# Patient Record
Sex: FEMALE | Race: OTHER | Employment: OTHER | URBAN - METROPOLITAN AREA
[De-identification: names, ages, dates, MRNs, and addresses within clinical notes are randomized per-mention and may not be internally consistent; named-entity substitution may affect disease eponyms.]

---

## 2018-03-30 ENCOUNTER — HOSPITAL ENCOUNTER (OUTPATIENT)
Dept: LAB | Age: 78
Discharge: HOME | End: 2018-03-30
Payer: MEDICARE

## 2018-03-30 PROCEDURE — 88305 TISSUE EXAM BY PATHOLOGIST: CPT | Performed by: PLASTIC SURGERY

## 2018-04-24 RX ORDER — ROSUVASTATIN CALCIUM 20 MG/1
20 TABLET, COATED ORAL
COMMUNITY
Start: 2018-02-21

## 2018-04-26 ENCOUNTER — ANESTHESIA EVENT (OUTPATIENT)
Dept: ENDOSCOPY | Age: 78
End: 2018-04-26
Payer: MEDICARE

## 2018-04-27 ENCOUNTER — HOSPITAL ENCOUNTER (OUTPATIENT)
Age: 78
Setting detail: OUTPATIENT SURGERY
Discharge: HOME | End: 2018-04-27
Attending: INTERNAL MEDICINE | Admitting: INTERNAL MEDICINE
Payer: MEDICARE

## 2018-04-27 ENCOUNTER — ANESTHESIA (OUTPATIENT)
Dept: ENDOSCOPY | Age: 78
End: 2018-04-27
Payer: MEDICARE

## 2018-04-27 VITALS
TEMPERATURE: 97 F | HEIGHT: 65 IN | RESPIRATION RATE: 12 BRPM | BODY MASS INDEX: 27.99 KG/M2 | OXYGEN SATURATION: 98 % | DIASTOLIC BLOOD PRESSURE: 78 MMHG | WEIGHT: 168 LBS | SYSTOLIC BLOOD PRESSURE: 125 MMHG | HEART RATE: 78 BPM

## 2018-04-27 PROBLEM — Z86.010 HISTORY OF COLON POLYPS: Status: ACTIVE | Noted: 2018-04-27

## 2018-04-27 PROCEDURE — 74011250636 HC RX REV CODE- 250/636

## 2018-04-27 PROCEDURE — 76060000031 HC ANESTHESIA FIRST 0.5 HR: Performed by: INTERNAL MEDICINE

## 2018-04-27 PROCEDURE — 74011250636 HC RX REV CODE- 250/636: Performed by: NURSE ANESTHETIST, CERTIFIED REGISTERED

## 2018-04-27 PROCEDURE — 93005 ELECTROCARDIOGRAM TRACING: CPT

## 2018-04-27 PROCEDURE — 76040000019: Performed by: INTERNAL MEDICINE

## 2018-04-27 PROCEDURE — 77030031670 HC DEV INFL ENDOTEK BIG60 MRTM -B: Performed by: INTERNAL MEDICINE

## 2018-04-27 RX ORDER — LIDOCAINE HYDROCHLORIDE 10 MG/ML
0.1 INJECTION, SOLUTION EPIDURAL; INFILTRATION; INTRACAUDAL; PERINEURAL AS NEEDED
Status: DISCONTINUED | OUTPATIENT
Start: 2018-04-27 | End: 2018-04-27 | Stop reason: HOSPADM

## 2018-04-27 RX ORDER — PROPOFOL 10 MG/ML
INJECTION, EMULSION INTRAVENOUS AS NEEDED
Status: DISCONTINUED | OUTPATIENT
Start: 2018-04-27 | End: 2018-04-27 | Stop reason: HOSPADM

## 2018-04-27 RX ORDER — SODIUM CHLORIDE, SODIUM LACTATE, POTASSIUM CHLORIDE, CALCIUM CHLORIDE 600; 310; 30; 20 MG/100ML; MG/100ML; MG/100ML; MG/100ML
25 INJECTION, SOLUTION INTRAVENOUS CONTINUOUS
Status: DISCONTINUED | OUTPATIENT
Start: 2018-04-27 | End: 2018-04-27 | Stop reason: HOSPADM

## 2018-04-27 RX ORDER — FAMOTIDINE 20 MG/1
20 TABLET, FILM COATED ORAL ONCE
Status: DISCONTINUED | OUTPATIENT
Start: 2018-04-27 | End: 2018-04-27 | Stop reason: HOSPADM

## 2018-04-27 RX ADMIN — SODIUM CHLORIDE, SODIUM LACTATE, POTASSIUM CHLORIDE, AND CALCIUM CHLORIDE: 600; 310; 30; 20 INJECTION, SOLUTION INTRAVENOUS at 08:23

## 2018-04-27 RX ADMIN — PROPOFOL 80 MG: 10 INJECTION, EMULSION INTRAVENOUS at 09:18

## 2018-04-27 RX ADMIN — PROPOFOL 60 MG: 10 INJECTION, EMULSION INTRAVENOUS at 09:30

## 2018-04-27 RX ADMIN — PROPOFOL 30 MG: 10 INJECTION, EMULSION INTRAVENOUS at 09:22

## 2018-04-27 RX ADMIN — PROPOFOL 30 MG: 10 INJECTION, EMULSION INTRAVENOUS at 09:26

## 2018-04-27 NOTE — ANESTHESIA POSTPROCEDURE EVALUATION
Post-Anesthesia Evaluation & Assessment    Visit Vitals    /78    Pulse 78    Temp 36.1 °C (97 °F)    Resp 12    Ht 5' 5\" (1.651 m)    Wt 76.2 kg (168 lb)    SpO2 98%    BMI 27.96 kg/m2       Nausea/Vomiting: no nausea and no vomiting    Pain score (VAS): 0    Post-operative hydration adequate.     Mental status & Level of consciousness: orientation per pre-anesthetic level    Neurological status: moves all extremities, sensation grossly intact    Pulmonary status: airway patent, no supplemental oxygen required    Complications related to anesthesia: none    Additional comments:        Patrice Schaefer CRNA  April 27, 2018

## 2018-04-27 NOTE — IP AVS SNAPSHOT
[address] [address] Shahrzad Raineydonnell Bullock 
[phone] Patient: Mirtha Barnhart MRN: [id number] THM:3[birth year]13 About your hospitalization You were admitted on:  April 27, 2018 You last received care in the:  [facility] PHASE 2 RECOVERY You were discharged on:  April 27, 2018 Why you were hospitalized Your primary diagnosis was:  History Of Colon Polyps Follow-up Information Follow up With Details Comments Contact Info Nicolas Anthony MD Schedule an appointment as soon as possible for a visit  [address] [facility] [address] [facility] [address] [phone] Juan C Kent MD  As needed [address] [facility] [address] [facility] [address] [phone] Discharge Orders None A check lloyd indicates which time of day the medication should be taken. My Medications CONTINUE taking these medications Instructions Each Dose to Equal  
 Morning Noon Evening Bedtime  
 cholecalciferol (vitamin D3) 2,000 unit Tab Your last dose was: Your next dose is: Take 1 Tab by mouth. 1 Tab  
    
   
   
   
  
 omeprazole 20 mg capsule Commonly known as:  PRILOSEC Your last dose was: Your next dose is: Take 20 mg by mouth daily. 20 mg  
    
   
   
   
  
 oxyCODONE-acetaminophen 5-325 mg per tablet Commonly known as:  PERCOCET Your last dose was: Your next dose is: Take 1-2 Tabs by mouth every four (4) hours as needed for Pain. Max Daily Amount: 12 Tabs. 1-2 Tab  
    
   
   
   
  
 rosuvastatin 20 mg tablet Commonly known as:  CRESTOR Your last dose was: Your next dose is:    
   
   
 20 mg.  
 20 mg  
    
   
   
   
  
 telmisartan-hydroCHLOROthiazide 80-12.5 mg per tablet Commonly known as:  MICARDIS HCT Your last dose was: Your next dose is: Take 1 Tab by mouth daily. 1 Tab Opioid Education Prescription Opioids: What You Need to Know: 
 
 
After general anesthesia or intravenous sedation, for 24 hours or while taking prescription Narcotics: · Limit your activities · Do not drive and operate hazardous machinery · Do not make important personal or business decisions · Do  not drink alcoholic beverages · If you have not urinated within 8 hours after discharge, please contact your surgeon on call. Report the following to your surgeon: 
· Excessive pain, swelling, redness or odor of or around the surgical area · Temperature over 100.5 · Nausea and vomiting lasting longer than 4 hours or if unable to take medications · Any signs of decreased circulation or nerve impairment to extremity: change in color, persistent  numbness, tingling, coldness or increase pain · Any questions What to do at Home: These are general instructions for a healthy lifestyle: No smoking/ No tobacco products/ Avoid exposure to second hand smoke Surgeon General's Warning:  Quitting smoking now greatly reduces serious risk to your health. Obesity, smoking, and sedentary lifestyle greatly increases your risk for illness A healthy diet, regular physical exercise & weight monitoring are important for maintaining a healthy lifestyle You may be retaining fluid if you have a history of heart failure or if you experience any of the following symptoms:  Weight gain of 3 pounds or more overnight or 5 pounds in a week, increased swelling in our hands or feet or shortness of breath while lying flat in bed. Please call your doctor as soon as you notice any of these symptoms; do not wait until your next office visit. Recognize signs and symptoms of STROKE: 
 
F-face looks uneven A-arms unable to move or move unevenly S-speech slurred or non-existent T-time-call 911 as soon as signs and symptoms begin-DO NOT go Back to bed or wait to see if you get better-TIME IS BRAIN. Warning Signs of HEART ATTACK Call 911 if you have these symptoms: 
? Chest discomfort. Most heart attacks involve discomfort in the center of the chest that lasts more than a few minutes, or that goes away and comes back. It can feel like uncomfortable pressure, squeezing, fullness, or pain. ? Discomfort in other areas of the upper body. Symptoms can include pain or discomfort in one or both arms, the back, neck, jaw, or stomach. ? Shortness of breath with or without chest discomfort. ? Other signs may include breaking out in a cold sweat, nausea, or lightheadedness. Don't wait more than five minutes to call [address]! Fast action can save your life. Calling 911 is almost always the fastest way to get lifesaving treatment. Emergency Medical Services staff can begin treatment when they arrive  up to an hour sooner than if someone gets to the hospital by car. The discharge information has been reviewed with the patient. The patient verbalized understanding.  
Discharge medications reviewed with the patient and appropriate educational materials and side effects teaching were provided. ___________________________________________________________________________________________________________________________________ Patient armband removed and given to patient to take home. Patient was informed of the privacy risks if armband lost or stolen Introducing [facility]! [facility] introduce portal paciente MyChart . Ahora se puede acceder a partes de sol expediente médico, enviar por correo electrónico la oficina de sol médico y solicitar renovaciones de medicamentos en línea. En sol navegador de Internet , Dee chow [email or web address] Tristen clic en el usuario por Adria Stearns? Chu Naty clic aquí en la sesión [facility]. Verá la página de registro [place]. Ingrese sol código de Bank of July myriam y apolinar aparece a continuación. Usted no tendrá que [employer] código después de arabella completado el proceso de registro . Si usted no se inscribe antes de la fecha de caducidad , debe solicitar un nuevo código. · MyChart Código de acceso : [id number] Expires: 7/26/2018  9:56 AM 
 
Ingresa los últimos cuatro dígitos de sol Número de Seguro Social ( xxxx ) y fecha de nacimiento ( dd / mm / aaaa ) apolinar se indica y tristen clic en Enviar. Usted será llevado a la siguiente página de registro . Crear un ID MyChart . Esta será sol ID de inicio de sesión de MyChart y no puede ser [place] , por lo que pensar en ritesh que es Saloni Hamming y fácil de recordar . Crear ritesh contraseña MyChart . Usted puede cambiar sol contraseña en cualquier momento . Ingrese sol Password Reset de preguntas y [place] . [place] se puede utilizar en un momento posterior si usted olvida sol contraseña. Introduzca sol dirección de correo electrónico . Nino Phan recibirá ritesh notificación por correo electrónico cuando la nueva información está disponible en MyChart . Jacki orellana en Registrarse.  Corin Pu maru y descargar porciones de sol expediente Katia orellana en el enlace de descarga del menú Resumen para descargar ritesh copia portátil de sol información médica . Si tiene [employer] , por favor visite la sección de preguntas frecuentes del sitio web MyChart . Recuerde, MyChart NO es que se utilizará para las necesidades urgentes. Para emergencias médicas , llame al 911 . Ahora disponible en sol iPhone y Android ! Introducing Sedrick Mendoza As a [facility] patient, I wanted to make you aware of our electronic visit tool called Sedrick Mendoza. [facility] 24/7 allows you to connect within minutes with a medical provider 24 hours a day, seven days a week via a mobile device or tablet or logging into a secure website from your computer. You can access Sedrick KnotProfit from anywhere in the [place]. A virtual visit might be right for you when you have a simple condition and feel like you just dont want to get out of bed, or cant get away from work for an appointment, when your regular Hussein Todacell provider is not available (evenings, weekends or holidays), or when youre out of town and need minor care. Electronic visits cost only $49 and if the [facility] 24/7 provider determines a prescription is needed to treat your condition, one can be electronically transmitted to a nearby pharmacy*. Please take a moment to enroll today if you have not already done so. The enrollment process is free and takes just a few minutes. To enroll, please download the Hussein Cho 24/7 richa to your tablet or phone, or visit [email or web address] to enroll on your computer. And, as an [address] [place] [address] patient with a [employer] account, the results of your visits will be scanned into your electronic medical record and your primary care provider will be able to view the scanned results.    
We urge you to continue to see your regular Hussein Cho provider for your ongoing medical care. And while your primary care provider may not be the one available when you seek a Sedrick Zamoranofin virtual visit, the peace of mind you get from getting a real diagnosis real time can be priceless. For more information on Sedrick Zamoranofin, view our Frequently Asked Questions (FAQs) at [email or web address]. Sincerely, 
 
Halina Berkowitz MD 
Chief Medical Officer Asael Garcia *:  certain medications cannot be prescribed via [employer] Unresulted Labs-Please follow up with your PCP about these lab tests Order Current Status EKG, 12 LEAD, INITIAL Preliminary result Providers Seen During Your Hospitalization Provider Specialty Primary office phone Alex Robles MD Gastroenterology [phone] Your Primary Care Physician (PCP) Primary Care Physician Office Phone Office Fax All Julio [phone] You are allergic to the following Allergen Reactions Penicillins Anaphylaxis Sulfa (Sulfonamide Antibiotics) Hives Recent Documentation Height Weight BMI OB Status Smoking Status 1.651 m 76.2 kg 27.96 kg/m2 Postmenopausal Never Smoker Emergency Contacts Name Discharge Info Relation Home Work Mobile Adolfo Retana DISCHARGE CAREGIVER [3] Spouse [3] [phone] Patient Belongings The following personal items are in your possession at time of discharge: 
     Visual Aid: None Discharge Instructions Attachments/References DIVERTICULOSIS (Greenlandic) Patient Handouts Diverticulosis: Care Instructions Your Care Instructions In diverticulosis, pouches called diverticula form in the wall of the large intestine (colon). The pouches do not cause any pain or other symptoms. Most people who have diverticulosis do not know they have it.  But the pouches sometimes bleed, and if they become infected, they can cause pain and other symptoms. When this happens, it is called diverticulitis. Diverticula form when pressure pushes the wall of the colon outward at certain weak points. A diet that is too low in fiber can cause diverticula. Follow-up care is a key part of your treatment and safety. Be sure to make and go to all appointments, and call your doctor if you are having problems. It's also a good idea to know your test results and keep a list of the medicines you take. How can you care for yourself at home? · Include fruits, leafy green vegetables, beans, and whole grains in your diet each day. These foods are high in fiber. · Take a fiber supplement, such as Citrucel or Metamucil, every day if needed. Read and follow all instructions on the label. · Drink plenty of fluids, enough so that your urine is light yellow or clear like water. If you have kidney, heart, or liver disease and have to limit fluids, talk with your doctor before you increase the amount of fluids you drink. · Get at least 30 minutes of exercise on most days of the week. Walking is a good choice. You also may want to do other activities, such as running, swimming, cycling, or playing tennis or team sports. · Cut out foods that cause gas, pain, or other symptoms. When should you call for help? Call your doctor now or seek immediate medical care if: 
? · You have belly pain. ? · You pass maroon or very bloody stools. ? · You have a fever. ? · You have nausea and vomiting. ? · You have unusual changes in your bowel movements or abdominal swelling. ? · You have burning pain when you urinate. ? · You have abnormal vaginal discharge. ? · You have shoulder pain. ? · You have cramping pain that does not get better when you have a bowel movement or pass gas. ? · You pass gas or stool from your urethra while urinating. ? Watch closely for changes in your health, and be sure to contact your doctor if you have any problems. Where can you learn more? Go to http://katharine-patricia.info/. Enter I744 in the search box to learn more about \"Diverticulosis: Care Instructions. \" Current as of: May 12, 2017 Content Version: 11.4 © 2006-2017 [employer]. Care instructions adapted under license by [employer]. [employer] (which disclaims liability or warranty for this information). If you have questions about a medical condition or this instruction, always ask your healthcare professional. [address] any warranty or liability for your use of this information. Please provide this summary of care documentation to your next provider. Signatures-by signing, you are acknowledging that this After Visit Summary has been reviewed with you and you have received a copy. Patient Signature:  ____________________________________________________________ Date:  ____________________________________________________________  
  
Nel Causeyamento Provider Signature:  ____________________________________________________________ Date:  ____________________________________________________________  
  
  
   
  
[address] [facility] 
 
 
 [address] 
[phone] Patient: Jose Cunha MRN: [id number] FJZ:7/0/3316 Sobre frank hospitalización Le admitieron el:  April 27, 2018 Frank tratamiento más reciente fue el:  [facility] PHASE 2 RECOVERY Le dieron de yesika el:  April 27, 2018 Por qué le ingresaron Frank diagnosis primaria es:  History Of Colon Polyps Follow-up Information Follow up With Details Comments Contact Info Michele Jacobo MD Schedule an appointment as soon as possible for a visit  [address] [facility] [address] [facility] 83 [address] 
[phone] Sandy Monroe MD  As needed [address] [facility] [address] [facility] [address] [phone] Discharge Orders [facility] A check lloyd indicates which time of day the medication should be taken. My Medications SIGA tomando estos medicamentos Instructions Each Dose to Equal  
 Morning Noon Evening Bedtime  
 cholecalciferol (vitamin D3) 2,000 unit Tab Your last dose was: Your next dose is: Take 1 Tab by mouth. 1 Tab  
    
   
   
   
  
 omeprazole 20 mg capsule También conocido apolinar:  Arevalo Eliud Your last dose was: Your next dose is: Take 20 mg by mouth daily. 20 mg  
    
   
   
   
  
 oxyCODONE-acetaminophen 5-325 mg per tablet También conocido apolinar:  PERCOCET Your last dose was: Your next dose is: Take 1-2 Tabs by mouth every four (4) hours as needed for Pain. Max Daily Amount: 12 Tabs. 1-2 Tab  
    
   
   
   
  
 rosuvastatin 20 mg tablet También conocido apolinar:  Erik Carey Your last dose was: Your next dose is:    
   
   
 20 mg.  
 20 mg  
    
   
   
   
  
 telmisartan-hydroCHLOROthiazide 80-12.5 mg per tablet También conocido apolinar:  MICARDIS HCT Your last dose was: Your next dose is: Take 1 Tab by mouth daily. 1 Tab Opioid Education Prescription Opioids: What You Need to Know: 
 
 
After general anesthesia or intravenous sedation, for 24 hours or while taking prescription Narcotics: · Limit your activities · Do not drive and operate hazardous machinery · Do not make important personal or business decisions · Do  not drink alcoholic beverages · If you have not urinated within 8 hours after discharge, please contact your surgeon on call. Report the following to your surgeon: 
· Excessive pain, swelling, redness or odor of or around the surgical area · Temperature over 100.5 · Nausea and vomiting lasting longer than 4 hours or if unable to take medications · Any signs of decreased circulation or nerve impairment to extremity: change in color, persistent  numbness, tingling, coldness or increase pain · Any questions What to do at Home: These are general instructions for a healthy lifestyle: No smoking/ No tobacco products/ Avoid exposure to second hand smoke Surgeon General's Warning:  Quitting smoking now greatly reduces serious risk to your health. Obesity, smoking, and sedentary lifestyle greatly increases your risk for illness A healthy diet, regular physical exercise & weight monitoring are important for maintaining a healthy lifestyle You may be retaining fluid if you have a history of heart failure or if you experience any of the following symptoms:  Weight gain of 3 pounds or more overnight or 5 pounds in a week, increased swelling in our hands or feet or shortness of breath while lying flat in bed. Please call your doctor as soon as you notice any of these symptoms; do not wait until your next office visit. Recognize signs and symptoms of STROKE: 
 
F-face looks uneven A-arms unable to move or move unevenly S-speech slurred or non-existent T-time-call 911 as soon as signs and symptoms begin-DO NOT go  
 Back to bed or wait to see if you get better-TIME IS BRAIN. Warning Signs of HEART ATTACK Call 911 if you have these symptoms: 
? Chest discomfort. Most heart attacks involve discomfort in the center of the chest that lasts more than a few minutes, or that goes away and comes back. It can feel like uncomfortable pressure, squeezing, fullness, or pain. ? Discomfort in other areas of the upper body. Symptoms can include pain or discomfort in one or both arms, the back, neck, jaw, or stomach. ? Shortness of breath with or without chest discomfort. ? Other signs may include breaking out in a cold sweat, nausea, or lightheadedness. Don't wait more than five minutes to call [address]! Fast action can save your life. Calling 911 is almost always the fastest way to get lifesaving treatment. Emergency Medical Services staff can begin treatment when they arrive  up to an hour sooner than if someone gets to the hospital by car. The discharge information has been reviewed with the patient. The patient verbalized understanding. Discharge medications reviewed with the patient and appropriate educational materials and side effects teaching were provided. ___________________________________________________________________________________________________________________________________ Patient armband removed and given to patient to take home. Patient was informed of the privacy risks if armband lost or stolen Introducing [facility]! [facility] introduce portal paciente BitLeaphart . Ahora se puede acceder a partes de sol expediente médico, enviar por correo electrónico la oficina de sol médico y solicitar renovaciones de medicamentos en línea. En sol navegador de Internet , Juan F chow [email or web address] Paul esteban en el usuario por Parveen Estrada? Suzi Packwood esteban aquí en la sesión Drenda Dapper. Verá la página de registro [place]. Ingrese osl código de Bank of July myriam y apolinar aparece a continuación. Usted no tendrá que [employer] código después de arabella completado el proceso de registro . Si usted no se inscribe antes de la fecha de caducidad , debe solicitar un nuevo código. · MyChart Código de acceso : [id number] Expires: 7/26/2018  9:56 AM 
 
Ingresa los últimos cuatro dígitos de sol Número de Seguro Social ( xxxx ) y fecha de nacimiento ( dd / mm / aaaa ) apolinar se indica y paul clic en Enviar. Usted será llevado a la siguiente página de registro . Crear un ID MyChart . Esta será sol ID de inicio de sesión de MyChart y no puede ser [place] , por lo que pensar en ritesh que es Terrial Piper y fácil de recordar . Crear ritesh contraseña MyChart . Usted puede cambiar sol contraseña en cualquier momento . Ingrese sol Password Reset de preguntas y [place] . Hoot Owl se puede utilizar en un momento posterior si usted olvida sol contraseña. Introduzca sol dirección de correo electrónico . [facility] recibirá ritesh notificación por correo electrónico cuando la nueva información está disponible en MyChart . Cielo Coupe clic en Registrarse. Dilip Angus maru y descargar porciones de sol expediente médico. 
Paul clic en el enlace de descarga del menú Resumen para descargar ritesh copia portátil de sol información médica . Si tiene [employer] , por favor visite la sección de preguntas frecuentes del sitio web MyChart . Recuerde, MyChart NO es que se utilizará para las necesidades urgentes. Para emergencias médicas , llame al 911 . Ahora disponible en sol iPhone y Android ! Introducing Sedrick Hannifin As a Annika Andrea patient, I wanted to make you aware of our electronic visit tool called Sedrick Mendoza. Annika Andrea 24/7 allows you to connect within minutes with a medical provider 24 hours a day, seven days a week via a mobile device or tablet or logging into a secure website from your computer.   You can access [facility] Elasticsearch 24/7 from anywhere in the [place]. A virtual visit might be right for you when you have a simple condition and feel like you just dont want to get out of bed, or cant get away from work for an appointment, when your regular Ad[facility] [place] [facility] provider is not available (evenings, weekends or holidays), or when youre out of town and need minor care. Electronic visits cost only $49 and if the [facility] provider determines a prescription is needed to treat your condition, one can be electronically transmitted to a nearby pharmacy*. Please take a moment to enroll today if you have not already done so. The enrollment process is free and takes just a few minutes. To enroll, please download the [employer] 24/7 richa to your tablet or phone, or visit [email or web address] to enroll on your computer. And, as an [address] [place] [address] patient with a [employer] account, the results of your visits will be scanned into your electronic medical record and your primary care provider will be able to view the scanned results. We urge you to continue to see your regular Adelaida Slocumb provider for your ongoing medical care. And while your primary care provider may not be the one available when you seek a [facility] virtual visit, the peace of mind you get from getting a real diagnosis real time can be priceless. For more information on [facility], view our Frequently Asked Questions (FAQs) at [email or web address]. Sincerely, 
 
Endy Mcdaniel MD 
Chief Medical Officer [facility] Leona Garcia *:  certain medications cannot be prescribed via [facility] Unresulted Labs-Please follow up with your PCP about these lab tests Order Current Status EKG, 12 LEAD, INITIAL Preliminary result Providers Seen During Your Hospitalization Personal Médico Especialidad Teléfono principal de la ofsolangea Dyan Quintero MD Gastroenterology [phone] Frank médico de atención primaria (PCP ) Primary Care Physician Office Phone Office Fax Perfecto Castillo [phone] Tiene alergias a lo siguiente Particia Betters Penicillins Anaphylaxis Sulfa (Sulfonamide Antibiotics) Hives Documentación recientes Height Weight [place] ([facility] Estado obstétrico Estatus de tabaquísmo 1.651 m 76.2 kg 27.96 kg/m2 Postmenopausal Never Smoker Emergency Contacts Name Discharge Info Relation Home Work Mobile RetanaTrevor taylorshi NORRIS DISCHARGE CAREGIVER [3] Spouse [3] [phone] Patient Belongings The following personal items are in your possession at time of discharge: 
     Visual Aid: None Discharge Instructions Attachments/References DIVERTICULOSIS (Kazakh) Patient Handouts Diverticulosis: Instrucciones de cuidado - [ Diverticulosis: Care Instructions ] Instrucciones de cuidado En la diverticulosis, se juliet bolsas, llamadas divertículos, en las meyer del intestino grueso (colon). Estas bolsas no causan dolor ni otros síntomas. La mayoría de las personas que tienen diverticulosis ignoran que la tienen. Sin embargo, las bolsas a veces sangran y, si se infectan, pueden causar dolor y otros síntomas. Si esto sucede, se habla de diverticulitis. Los divertículos se juliet cuando la presión [employer] meyer del colon hacia afuera en ciertos puntos débiles. Татьяна dieta muy baja en fibra puede causar divertículos. La atención de seguimiento es татьяна parte clave de frank tratamiento y seguridad. Asegúrese de hacer y acudir a todas las citas, y llame a frank médico si está teniendo problemas. También es татьяна buena idea saber los resultados de los exámenes y mantener татьяна lista de los medicamentos que francia. Cómo puede cuidarse en el hogar?  
· Incluya frutas, verduras de hojas verdes, frijoles (habichuelas) y granos integrales en sol Mickeal Aase. Estos alimentos son ricos en fibra. · Si es necesario, tome un suplemento de Gail, apolinar Citrucel o Metamucil, todos los [address] Comience con [place] dosis pequeña y Yohana poco a poco shashank un mes o más. · Reshma abundantes líquidos, suficientes para que sol orina sea de color amarillo luz maria o katina apolinar el agua. Si tiene ritesh enfermedad del riñón, del corazón o del hígado y tiene que Saba's líquidos, hable con sol médico antes de aumentar sol consumo. · Paul, apolinar mínimo, 30 minutos de ejercicio la mayoría de los días de la [place]. Caminar es ritesh buena opción. Sammye Founds desee hacer otras actividades, apolinar correr, nadar, [employer], o jugar al tenis u otros deportes de equipo. · Elimine los alimentos que causen gases, dolor u otros síntomas. Cuándo debe pedir ayuda? Llame a sol médico ahora mismo o busque atención médica inmediata si: 
? · Tiene dolor en el vientre. ? · Las heces son de color rojizo o muy sanguinolentas (con disha). ? · Tiene fiebre. ? · Tiene náuseas y vómito. ? · Tiene cambios inusuales en sol evacuación intestinal o hinchazón en el vientre. ? · Siente dolor y ardor al orinar. ? · Tiene flujo vaginal anormal.  
? · Tiene dolor en el hombro. ? · Tiene dolor por retortijones que no mejora con ritesh evacuación intestinal o al pasar gases. ? · Pasa gases o heces por la uretra al Natalie Bonus. ?Preste especial atención a los cambios en sol clint y asegúrese de comunicarse con sol médico si tiene algún problema. Dónde puede encontrar más información en inglés? Amber Wynn a http://katharine-patricia.info/. Hallie Servant D346 en la búsqueda para aprender Dock Dauer de \"Diverticulosis: Instrucciones de cuidado - [ Diverticulosis: Care Instructions ]. \" 
Revisado: [address], 2017 Versión del contenido: 11.4 © 3737-1666 [employer].  Las instrucciones de cuidado fueron adaptadas bajo licencia por [employer] (which disclaims liability or warranty for this information). Si usted tiene [place] afección médica o sobre estas instrucciones, siempre pregunte a sol profesional de clint. Healthwise, Incorporated niega toda garantía o responsabilidad por sol uso de esta información. Please provide this summary of care documentation to your next provider. Signatures-by signing, you are acknowledging that this After Visit Summary has been reviewed with you and you have received a copy. Patient Signature:  ____________________________________________________________ Date:  ____________________________________________________________  
  
[place] Provider Signature:  ____________________________________________________________ Date:  ____________________________________________________________

## 2018-04-27 NOTE — H&P
History and Physical    Noe Torres  1940  [id number]  [id number]    Assessment:  Hx of colon polyps. Treatment/Plan:  Colonoscopy    History of present illness:  69 y.o. female here for surveillance  Colonoscopy bec of hx of colon polyps. ROS: No chest pain, shortness of breath, headaches, dizziness, lightheadedness, nausea, vomiting or abdominal pain. Evaluation of past illnesses, surgeries, or injuries:  yes    Past Medical History:   Diagnosis Date    GERD (gastroesophageal reflux disease)     Hypercholesteremia     Hypertension     Irritable bowel syndrome        Past Surgical History:   Procedure Laterality Date    HX BREAST BIOPSY      HX CATARACT REMOVAL      HX KNEE ARTHROSCOPY      HX KNEE REPLACEMENT Right        Allergies: Allergies   Allergen Reactions    Penicillins Anaphylaxis    Sulfa (Sulfonamide Antibiotics) Hives       Previous reactions to sedation/analgesia? no     Review of current medications, supplement, herbals and nutraceuticals complete:  yes    Pertinent labs reviewed? yes    History of active substance abuse?  no    History reviewed. No pertinent family history. Social History     Social History    Marital status:      Spouse name: N/A    Number of children: N/A    Years of education: N/A     Occupational History    Not on file. Social History Main Topics    Smoking status: Never Smoker    Smokeless tobacco: Never Used    Alcohol use 8.4 oz/week     14 Glasses of wine per week    Drug use: No    Sexual activity: Not on file     Other Topics Concern    Not on file     Social History Narrative       Examination:  Cardiac Status:  WNL    Mental Status:  WNL     Pulmonary Status:  WNL    NPO:  9-12    Zhou Aparicio MD, [place]  4/27/2018  8:27 AM

## 2018-04-27 NOTE — DISCHARGE INSTRUCTIONS
For the next 12 hours you should not:   1. drive   2. drink alcohol   3. operate any machinery   4. engage in activities that require mental sharpness or manual dexterity    5. take any drugs other than those prescribed by a physician   6. make any legal or financial decisions    Call your doctor's office immediately, if:   1. Severe rectal bleeding   2. High fever   3. Severe abdominal pain    Take it easy today and resume normal activity tomorrow. Resume previous diet. Zhou Bridges MD, FACP         DISCHARGE SUMMARY from Nurse    PATIENT INSTRUCTIONS:    After general anesthesia or intravenous sedation, for 24 hours or while taking prescription Narcotics:  · Limit your activities  · Do not drive and operate hazardous machinery  · Do not make important personal or business decisions  · Do  not drink alcoholic beverages  · If you have not urinated within 8 hours after discharge, please contact your surgeon on call. Report the following to your surgeon:  · Excessive pain, swelling, redness or odor of or around the surgical area  · Temperature over 100.5  · Nausea and vomiting lasting longer than 4 hours or if unable to take medications  · Any signs of decreased circulation or nerve impairment to extremity: change in color, persistent  numbness, tingling, coldness or increase pain  · Any questions    What to do at Home:    These are general instructions for a healthy lifestyle:    No smoking/ No tobacco products/ Avoid exposure to second hand smoke  Surgeon General's Warning:  Quitting smoking now greatly reduces serious risk to your health.     Obesity, smoking, and sedentary lifestyle greatly increases your risk for illness    A healthy diet, regular physical exercise & weight monitoring are important for maintaining a healthy lifestyle    You may be retaining fluid if you have a history of heart failure or if you experience any of the following symptoms:  Weight gain of 3 pounds or more overnight or 5 pounds in a week, increased swelling in our hands or feet or shortness of breath while lying flat in bed. Please call your doctor as soon as you notice any of these symptoms; do not wait until your next office visit. Recognize signs and symptoms of STROKE:    F-face looks uneven    A-arms unable to move or move unevenly    S-speech slurred or non-existent    T-time-call 911 as soon as signs and symptoms begin-DO NOT go       Back to bed or wait to see if you get better-TIME IS BRAIN. Warning Signs of HEART ATTACK     Call 911 if you have these symptoms:   Chest discomfort. Most heart attacks involve discomfort in the center of the chest that lasts more than a few minutes, or that goes away and comes back. It can feel like uncomfortable pressure, squeezing, fullness, or pain.  Discomfort in other areas of the upper body. Symptoms can include pain or discomfort in one or both arms, the back, neck, jaw, or stomach.  Shortness of breath with or without chest discomfort.  Other signs may include breaking out in a cold sweat, nausea, or lightheadedness. Don't wait more than five minutes to call 911 - MINUTES MATTER! Fast action can save your life. Calling 911 is almost always the fastest way to get lifesaving treatment. Emergency Medical Services staff can begin treatment when they arrive -- up to an hour sooner than if someone gets to the hospital by car. The discharge information has been reviewed with the patient. The patient verbalized understanding. Discharge medications reviewed with the patient and appropriate educational materials and side effects teaching were provided. ___________________________________________________________________________________________________________________________________     Patient armband removed and given to patient to take home.   Patient was informed of the privacy risks if armband lost or stolen

## 2018-04-27 NOTE — ANESTHESIA PREPROCEDURE EVALUATION
Anesthetic History   No history of anesthetic complications            Review of Systems / Medical History  Patient summary reviewed and pertinent labs reviewed    Pulmonary  Within defined limits                 Neuro/Psych   Within defined limits           Cardiovascular    Hypertension: well controlled              Exercise tolerance: <4 METS     GI/Hepatic/Renal     GERD: well controlled           Endo/Other        Obesity     Other Findings   Comments: Documentation of current medication  Current medications obtained, documented and obtained? YES      Risk Factors for Postoperative nausea/vomiting:       History of postoperative nausea/vomiting? NO       Female? YES       Motion sickness? NO       Intended opioid administration for postoperative analgesia? NO      Smoking Abstinence:  Current Smoker? NO  Elective Surgery? YES  Seen preoperatively by anesthesiologist or proxy prior to day of surgery? YES  Pt abstained from smoking 24 hours prior to anesthesia?  N/A    Preventive care/screening for High Blood Pressure:  Aged 18 years and older: YES  Screened for high blood pressure: YES  Patients with high blood pressure referred to primary care provider   for BP management: YES                     Physical Exam    Airway  Mallampati: II  TM Distance: 4 - 6 cm  Neck ROM: normal range of motion   Mouth opening: Normal     Cardiovascular  Regular rate and rhythm,  S1 and S2 normal,  no murmur, click, rub, or gallop             Dental  No notable dental hx       Pulmonary  Breath sounds clear to auscultation               Abdominal  GI exam deferred       Other Findings            Anesthetic Plan    ASA: 3  Anesthesia type: general          Induction: Intravenous  Anesthetic plan and risks discussed with: Patient

## 2018-04-27 NOTE — PROCEDURES
Colonoscopy Report     Date of Procedure:2018       Patient: Fan Jasso  Date of Birth: @     MRN: [id number]   Age: 69 y.o.   SSN: [id number]  Sex: female            FINDINGS & IMPRESSION:  1. Moderate sigmoid diverticulosis and hypertrophic folds. 2. No polyps or tumors. 3. Small hemorrhoids. RECOMMENDATIONS  1. Resume all home medications and diet. 2. No further screening or surveillance colonoscopies due to advanced age per current ACG guidelines. 3. She will return to the care of Dr. Tano Gagnon and follow-up with me as needed. Indication:  Personal history of colon polyps (screening only)  Procedure Performed: Colonoscopy   Endoscopist: Carmencita Polanco MD  Assistant: Endoscopy Technician-1: Neeta Hermosillo  Endoscopy Technician-2: Trev Emery  Endoscopy RN-1: Tim Simon RN  ASA: ASA 3 - Patient with moderate systemic disease with functional limitations  Mallampati Score: II (soft palate, uvula, fauces visible)  Anesthesia: MAC anesthesia  Endoscope: [id number]  Extent of Examination:cecum, which was identified by the ileocecal valve and appendiceal orifice  Quality of Preparation:     [x]  Excellent   []  Very Good   [] Fair but adequate   [] Fair, inadequate   []  Poor      Technique: Informed consent was obtained. A safety timeout was performed. The patient was placed in left lateral position. A perianal inspection and a digital rectal exam were performed. The patients vital signs were monitored at all times including heart rate, rhythm, blood pressure and oxygen saturation. Sedation/anesthesia was administered. When adequate sedation was achieved the video colonoscope was introduced into the rectum and advanced under direct vision up to the cecum, which was identified by the ileocecal valve and appendiceal orifice. The cecum was identified by ileocecal valve, appendiceal orifice and confluence of the colonic folds.  The terminal ileum was not intubated. With adequate insufflation and maneuvering of the withdrawing scope, the colonic mucosa was visualized carefully. Retroflexion was performed in the rectum and the distal rectum visualized. The patient tolerated the procedure very well and was transferred to recovery area. EBL:  None    Specimen: None      Zhou Rosado MD, [facility]  [facility]  April 27, 2018  9:35 AM

## 2018-04-28 LAB
ATRIAL RATE: 89 BPM
CALCULATED P AXIS, ECG09: 46 DEGREES
CALCULATED R AXIS, ECG10: -2 DEGREES
CALCULATED T AXIS, ECG11: 14 DEGREES
DIAGNOSIS, 93000: NORMAL
P-R INTERVAL, ECG05: 148 MS
Q-T INTERVAL, ECG07: 354 MS
QRS DURATION, ECG06: 74 MS
QTC CALCULATION (BEZET), ECG08: 430 MS
VENTRICULAR RATE, ECG03: 89 BPM

## (undated) DEVICE — FLEX ADVANTAGE 1500CC: Brand: FLEX ADVANTAGE

## (undated) DEVICE — MEDI-VAC NON-CONDUCTIVE SUCTION TUBING: Brand: CARDINAL HEALTH

## (undated) DEVICE — SYR 50ML SLIP TIP NSAF LF STRL --

## (undated) DEVICE — KIT COLON W/ 1.1OZ LUB AND 2 END

## (undated) DEVICE — DEVICE INFL 60ML 12ATM CONVENIENT LOK REL HNDL HI PRSS FLX

## (undated) DEVICE — KENDALL 500 SERIES DIAPHORETIC FOAM MONITORING ELECTRODE - TEAR DROP SHAPE ( 30/PK): Brand: KENDALL

## (undated) DEVICE — BASIN EMESIS 500CC ROSE 250/CS 60/PLT: Brand: MEDEGEN MEDICAL PRODUCTS, LLC